# Patient Record
Sex: FEMALE | Race: WHITE | NOT HISPANIC OR LATINO | Employment: FULL TIME
[De-identification: names, ages, dates, MRNs, and addresses within clinical notes are randomized per-mention and may not be internally consistent; named-entity substitution may affect disease eponyms.]

---

## 2019-10-05 ENCOUNTER — HEALTH MAINTENANCE LETTER (OUTPATIENT)
Age: 69
End: 2019-10-05

## 2020-02-10 ENCOUNTER — HEALTH MAINTENANCE LETTER (OUTPATIENT)
Age: 70
End: 2020-02-10

## 2020-11-14 ENCOUNTER — HEALTH MAINTENANCE LETTER (OUTPATIENT)
Age: 70
End: 2020-11-14

## 2021-04-03 ENCOUNTER — HEALTH MAINTENANCE LETTER (OUTPATIENT)
Age: 71
End: 2021-04-03

## 2021-09-12 ENCOUNTER — HEALTH MAINTENANCE LETTER (OUTPATIENT)
Age: 71
End: 2021-09-12

## 2021-11-07 ENCOUNTER — HEALTH MAINTENANCE LETTER (OUTPATIENT)
Age: 71
End: 2021-11-07

## 2022-04-21 ENCOUNTER — HOSPITAL ENCOUNTER (EMERGENCY)
Facility: CLINIC | Age: 72
Discharge: HOME | End: 2022-04-21
Attending: NURSE PRACTITIONER | Admitting: NURSE PRACTITIONER
Payer: MEDICARE

## 2022-04-21 VITALS
OXYGEN SATURATION: 95 % | DIASTOLIC BLOOD PRESSURE: 66 MMHG | HEART RATE: 101 BPM | TEMPERATURE: 97.7 F | WEIGHT: 104 LBS | RESPIRATION RATE: 16 BRPM | SYSTOLIC BLOOD PRESSURE: 135 MMHG | BODY MASS INDEX: 17.96 KG/M2

## 2022-04-21 DIAGNOSIS — R31.29 MICROSCOPIC HEMATURIA: ICD-10-CM

## 2022-04-21 DIAGNOSIS — H66.93 BILATERAL ACUTE OTITIS MEDIA: ICD-10-CM

## 2022-04-21 LAB
ALBUMIN UR-MCNC: NEGATIVE MG/DL
APPEARANCE UR: CLEAR
BILIRUB UR QL STRIP: NEGATIVE
COLOR UR AUTO: YELLOW
GLUCOSE UR STRIP-MCNC: NEGATIVE MG/DL
HGB UR QL STRIP: ABNORMAL
KETONES UR STRIP-MCNC: 20 MG/DL
LEUKOCYTE ESTERASE UR QL STRIP: NEGATIVE
MUCOUS THREADS #/AREA URNS LPF: PRESENT /LPF
NITRATE UR QL: NEGATIVE
PH UR STRIP: 5 [PH] (ref 5–7)
RBC URINE: 20 /HPF
SP GR UR STRIP: 1.01 (ref 1–1.03)
UROBILINOGEN UR STRIP-MCNC: NORMAL MG/DL
WBC URINE: <1 /HPF

## 2022-04-21 PROCEDURE — 250N000013 HC RX MED GY IP 250 OP 250 PS 637: Performed by: NURSE PRACTITIONER

## 2022-04-21 PROCEDURE — 99283 EMERGENCY DEPT VISIT LOW MDM: CPT

## 2022-04-21 PROCEDURE — 99284 EMERGENCY DEPT VISIT MOD MDM: CPT | Performed by: NURSE PRACTITIONER

## 2022-04-21 PROCEDURE — 81003 URINALYSIS AUTO W/O SCOPE: CPT | Performed by: NURSE PRACTITIONER

## 2022-04-21 RX ORDER — AMOXICILLIN 500 MG/1
500 CAPSULE ORAL 3 TIMES DAILY
Qty: 21 CAPSULE | Refills: 0 | Status: SHIPPED | OUTPATIENT
Start: 2022-04-21 | End: 2022-04-28

## 2022-04-21 RX ORDER — ACETAMINOPHEN 325 MG/1
650 TABLET ORAL ONCE
Status: COMPLETED | OUTPATIENT
Start: 2022-04-21 | End: 2022-04-21

## 2022-04-21 RX ADMIN — ACETAMINOPHEN 650 MG: 325 TABLET, FILM COATED ORAL at 18:50

## 2022-04-21 NOTE — ED PROVIDER NOTES
History     Chief Complaint   Patient presents with     Altered Mental Status     HPI  Brenda Dutta is a 72 year old female with history of metastatic neuroendocrine/ovarian tumor (on chemotherapy, following at [facility]) and Htn  who presents via EMS for confusion. Patient tells me she was watching her grandchildren from 7am-1pm today in [place], [place] and then had to drive to her doctor's appointment in [place], [place] where she had blood work today. She is used to driving from her home in [place]. She did get a bit disoriented on her way to the appointment earlier today and had to call her  who instructed her on directions to her appointment. After leaving the clinic this afternoon in [place] she was pulled over by police on [address] [place] of [place] due to driving at low speed of 50 mph. She thought she was south of [place] and seemed disoriented.  Denies fever or chills. Reports sinus congestion for the last week. Complains of right ear pain for 3 days and now left ear hurting as well today. Reports low back pain that she attributes to driving in car.  Denies headache, chest pain, nausea or vomiting. Denies urinary symptoms.  Patient has appointment for chemotherapy tomorrow at [facility]    Labs done today at her oncology appointment.            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  Treated on GOG 90 with VAC and BEP.  Second look surgery revealed mature teratomatous elements only.  IMO Regulatory Load OCT 2020       Post-operative state 05/23/2013     Priority: Medium     Lung nodule 04/26/2013     Priority: Medium        Past Medical History:    Past Medical History:   Diagnosis Date     Arthritis      History of chemotherapy      HTN  (hypertension)      Osteopenia      Ovary cancer (H) 1988       Past Surgical History:    Past Surgical History:   Procedure Laterality Date     COLONOSCOPY       GI SURGERY       GYN SURGERY       LAPAROTOMY SECOND LOOK  1989    mature teratoma elements found     THORACOSCOPIC WEDGE RESECTION LUNG  5/23/2013    Procedure: THORACOSCOPIC WEDGE RESECTION LUNG;  Right Video Assisted Thorascopic Removal of Pleural Implants ;  Surgeon: João Pierre MD;  Location:  OR     [facility] RESEC OVAR MALIG+TODD+PELV NODES  1988    immature teratoma       Family History:    History reviewed. No pertinent family history.    Social History:  Marital Status:   [2]  Social History     Tobacco Use     Smoking status: Passive Smoke Exposure - Never Smoker     Smokeless tobacco: Never Used     Tobacco comment: live with a smoker x30 years   Substance Use Topics     Alcohol use: Not Currently     Comment: does not because of chemo     Drug use: No        Medications:    amoxicillin (AMOXIL) 500 MG capsule  Alendronate Sodium (FOSAMAX PO)  aspirin 81 MG tablet  Calcium Carbonate-Vitamin D (CALCIUM + D PO)  fish oil-omega-3 fatty acids (FISH OIL) 1000 MG capsule  HYDROcodone-acetaminophen 5-325 MG per tablet  LISINOPRIL PO  Multiple Vitamin (MULTI-VITAMIN PO)  polyethylene glycol (MIRALAX/GLYCOLAX) packet  senna-docusate (SENOKOT-S;PERICOLACE) 8.6-50 MG per tablet          Review of Systems  As mentioned above in the history present illness. All other systems were reviewed and are negative.    Physical Exam   BP: 135/66  Pulse: 101  Temp: 97.7  F (36.5  C)  Resp: 16  Weight: 47.2 kg (104 lb)  SpO2: 95 %      Physical Exam  Constitutional:       General: She is not in acute distress.     Appearance: Normal appearance. She is well-developed. She is not ill-appearing.   HENT:      Head: Normocephalic and atraumatic.      Right Ear: External ear normal.      Left Ear: External ear normal.      Nose: Nose normal.       Mouth/Throat:      Mouth: Mucous membranes are moist.   Eyes:      Conjunctiva/sclera: Conjunctivae normal.   Cardiovascular:      Rate and Rhythm: Normal rate and regular rhythm.      Heart sounds: Normal heart sounds. No murmur heard.  Pulmonary:      Effort: Pulmonary effort is normal. No respiratory distress.      Breath sounds: Normal breath sounds.   Abdominal:      General: Bowel sounds are normal. There is no distension.      Palpations: Abdomen is soft.      Tenderness: There is no abdominal tenderness.   Musculoskeletal:         General: Normal range of motion.   Skin:     General: Skin is warm and dry.      Findings: No rash.   Neurological:      General: No focal deficit present.      Mental Status: She is alert and oriented to person, place, and time.      Comments: She is able to provide history and memory recall normal.         ED Course                 Procedures              Results for orders placed or performed during the hospital encounter of 04/21/22 (from the past 24 hour(s))   UA with Microscopic reflex to Culture    Specimen: Urine, Clean Catch   Result Value Ref Range    Color Urine Yellow Colorless, Straw, Light Yellow, Yellow    Appearance Urine Clear Clear    Glucose Urine Negative Negative mg/dL    Bilirubin Urine Negative Negative    Ketones Urine 20  (A) Negative mg/dL    Specific Gravity Urine 1.015 1.003 - 1.035    Blood Urine Large (A) Negative    pH Urine 5.0 5.0 - 7.0    Protein Albumin Urine Negative Negative mg/dL    Urobilinogen Urine Normal Normal, 2.0 mg/dL    Nitrite Urine Negative Negative    Leukocyte Esterase Urine Negative Negative    Mucus Urine Present (A) None Seen /LPF    RBC Urine 20 (H) <=2 /HPF    WBC Urine <1 <=5 /HPF    Narrative    Urine Culture not indicated       Medications   acetaminophen (TYLENOL) tablet 650 mg (650 mg Oral Given 4/21/22 1850)       Assessments & Plan (with Medical Decision Making)   Blood work from clinic done earlier today as noted  above, no significant concerns. UA done here today given her possible disorientation and complaints of low back pain. UA is negative for infection, but is positive for blood, 20 ketones, and 20 RBCs. She does have history of hematuria noted in her problem list. No history of kidney stones. She is not having unilateral flank pain.  She has notable bilateral acute otitis media.  Patient has not had anything to eat since 6-7am this morning at her son's home. She at a sandwich and juice here. Her  is at the bedside.   Patient was given prescription for amoxicillin to treat her ear infection.  Patient instructed to follow-up tomorrow at [facility] as scheduled.  Recheck for any worsening symptoms.    Discharge Medication List as of 4/21/2022  7:16 PM      START taking these medications    Details   amoxicillin (AMOXIL) 500 MG capsule Take 1 capsule (500 mg) by mouth 3 times daily for 7 days, Disp-21 capsule, R-0, E-Prescribe             Final diagnoses:   Bilateral acute otitis media   Microscopic hematuria       4/21/2022   [facility] EMERGENCY DEPT     Alex, PRAVEEN Connor CNP  04/21/22 2032

## 2022-04-21 NOTE — ED NOTES
Bed: ED02  Expected date:   Expected time:   Means of arrival:   Comments:  [place] [address]  72 yr female  Increased confusion

## 2022-04-21 NOTE — ED TRIAGE NOTES
Pt was [place] over by police [place] [address], she thought that she was south of [place] but was actually [place] of [place], she was at an appointment in [place] earlier in the day, she lives in [place], she is alert and oriented at triage, she is being treated for metastatic cancer and is due for chemo tomorrow at [place]

## 2022-04-22 NOTE — DISCHARGE INSTRUCTIONS
Amoxicillin 500 mg three times a day for 7 days.  Recheck tomorrow at [facility] for your appointment as scheduled.

## 2022-04-24 ENCOUNTER — HEALTH MAINTENANCE LETTER (OUTPATIENT)
Age: 72
End: 2022-04-24

## 2022-11-19 ENCOUNTER — HEALTH MAINTENANCE LETTER (OUTPATIENT)
Age: 72
End: 2022-11-19

## 2023-06-01 ENCOUNTER — HEALTH MAINTENANCE LETTER (OUTPATIENT)
Age: 73
End: 2023-06-01

## 2023-11-18 ENCOUNTER — HEALTH MAINTENANCE LETTER (OUTPATIENT)
Age: 73
End: 2023-11-18

## 2024-04-24 ENCOUNTER — TRANSFERRED RECORDS (OUTPATIENT)
Dept: HEALTH INFORMATION MANAGEMENT | Facility: CLINIC | Age: 74
End: 2024-04-24
Payer: MEDICARE

## 2024-08-24 ENCOUNTER — HEALTH MAINTENANCE LETTER (OUTPATIENT)
Age: 74
End: 2024-08-24

## 2024-12-18 ENCOUNTER — DOCUMENTATION ONLY (OUTPATIENT)
Dept: OTHER | Facility: CLINIC | Age: 74
End: 2024-12-18
Payer: MEDICARE

## 2025-03-01 ENCOUNTER — HEALTH MAINTENANCE LETTER (OUTPATIENT)
Age: 75
End: 2025-03-01